# Patient Record
(demographics unavailable — no encounter records)

---

## 2025-02-07 NOTE — ASSESSMENT
[FreeTextEntry1] : 56 yo female with her2 positive right breast cancer plan mg/sono DEC 2025 plan MRI MAR 2025 rec PT, she will do at [facility] [place] We reviewed risk reduction strategies including maintaining a BMI <25, limiting red meat intake and alcoholic beverages to 3 per week and exercise (150 min/ week low intensity or 75 min/week high intensity). And maintaining a normal vitamin D level  and stress management strategies. cont therapy and surveillace per Dr. Adri OSWALD WN and next in 6 months f/u 6 months She knows to call or return sooner should any concerns or questions arise.

## 2025-02-07 NOTE — CONSULT LETTER
[Dear  ___] : Dear  [unfilled], [Courtesy Letter:] : I had the pleasure of seeing your patient, [unfilled], in my office today. [Please see my note below.] : Please see my note below. [Consult Closing:] : Thank you very much for allowing me to participate in the care of this patient.  If you have any questions, please do not hesitate to contact me. [Sincerely,] : Sincerely, [DrGenevieve  ___] : Dr. BUCKLEY [FreeTextEntry3] : Leighann Miguel [place] [address]

## 2025-02-07 NOTE — PHYSICAL EXAM
[Normocephalic] : normocephalic [Atraumatic] : atraumatic [Supple] : supple [No Supraclavicular Adenopathy] : no supraclavicular adenopathy [Examined in the supine and seated position] : examined in the supine and seated position [Symmetrical] : symmetrical [No dominant masses] : no dominant masses left breast [No Nipple Retraction] : no left nipple retraction [No Nipple Discharge] : no left nipple discharge [No Axillary Lymphadenopathy] : no left axillary lymphadenopathy [No Edema] : no edema [No Rashes] : no rashes [No Ulceration] : no ulceration [de-identified] : healing pmx/slne no masses, incision c/d/i, there is moderate brawny induration c/w rtx, there is a slight redness central LOQ but diminishes in supine position [de-identified] : healing incisions, no masses

## 2025-02-07 NOTE — PHYSICAL EXAM
[Normocephalic] : normocephalic [Atraumatic] : atraumatic [Supple] : supple [No Supraclavicular Adenopathy] : no supraclavicular adenopathy [Examined in the supine and seated position] : examined in the supine and seated position [Symmetrical] : symmetrical [No dominant masses] : no dominant masses left breast [No Nipple Retraction] : no left nipple retraction [No Nipple Discharge] : no left nipple discharge [No Axillary Lymphadenopathy] : no left axillary lymphadenopathy [No Edema] : no edema [No Rashes] : no rashes [No Ulceration] : no ulceration [de-identified] : healing pmx/slne no masses, incision c/d/i, there is moderate brawny induration c/w rtx, there is a slight redness central LOQ but diminishes in supine position [de-identified] : healing incisions, no masses

## 2025-02-07 NOTE — HISTORY OF PRESENT ILLNESS
[FreeTextEntry1] : Magalie is a 57 year-old female referred for invasive carcinoma of the right breast. Her screening mammogram (12/15/2023, [facility]) showed an 8 mm irregular mass at 8:00 near the areolar margin with correlating ultrasound results of an irregular angular hypoechoic lesion. Ultrasound biopsy was recommended and done on 12/28/2023 ([facility]). Pathology of right 8:00 ultrasound biopsy showed invasive mammary carcinoma with lobular features, grade 6/9, ER strongly positive in >90%, UT strongly positive in >90%. Her 2 is equivocal with positive FISH.  A staging MRI done on 01/04/2024 ([facility]) showed a right 8:00 1.2 x 0.7 x 0.9 cm irregular mass with washout kinetics in the right breast 8:00 in the area of the known malignancy. No other suspicious lesions were found bilaterally.     She is now s/p right pmx/slne 2/1/2024 with Dr. Gómez pathology showed 16mm ILC, grade 2, negative margins 0/2 SLNE Estrogen >95%, Progesterone 90% positive , Her 2 was equivocal and FISH was NEGATIVE ki 67 30-40%. Her RS was 10.  She is doing well postop. She will be seeing Dr. Rush who placed her on Herceptin since she is [id number]  and rad/onc at [facility] [place]. She started tamoxifen 10/2024. She completed RTx with Dr. Smith 6/11/2024. She is on herceptin.  She does SBE.  She has not noticed a change in her breast or a breast lump. She first felt a right breast lump in August 2023 and thought it was a cyst. She has not noticed a change in her nipple or nipple area. She has not noticed a change in the skin of the breast. She is not experiencing nipple discharge. She is not experiencing breast pain on left, on right she has pain She has not noticed a lump or lymph node under the armpit.   BREAST CANCER RISK FACTORS Menarche: 10 Date of LMP: 2021 Menopause: unknown hysterectomy at 44 Grav: 2      Para: 2 (daughter and son) Age at first live birth: 28 Nursed: yes Hysterectomy: yes 2021 Oophorectomy: no  OCP: yes 8-9y HRT: no Last pap/pelvic exam: 10/21/2024  WNL Related family history:  father prostate, skin (basal and squamous) , renal cell and rectal cancer, mat gf stomach cancer, pat gm colon cancer Ashkenazi: yes Mastery risk assessment:  n/a BRCA testing: no Bra size: 34 B+  Last mammogram:12/13/2024                              Location: [facility] Report reviewed.                                 Images reviewed. Results: Birads 2  Heterogeneously dense post lumpectomy and post radiation changes in the RIGHT breast. No mammographic evidence of malignancy bilaterally.   Last ultrasound:     12/13/2024                              Location: [facility] Report reviewed.                                 Images reviewed.  Results: Birads 2 No sonographic evidence of malignancy.   Last MRI:     01/04/2024                                        Location: [facility] Report reviewed. Results: Birads Birads 6 Right 8:00 1.2 x 0.7 x 0.9 cm irregular shaped mass with irregular margins and washout kinetics in area of known malignancy. No suspicious areas of enhancement in left breast.

## 2025-02-07 NOTE — HISTORY OF PRESENT ILLNESS
[FreeTextEntry1] : Magalie is a 57 year-old female referred for invasive carcinoma of the right breast. Her screening mammogram (12/15/2023, [facility]) showed an 8 mm irregular mass at 8:00 near the areolar margin with correlating ultrasound results of an irregular angular hypoechoic lesion. Ultrasound biopsy was recommended and done on 12/28/2023 ([facility]). Pathology of right 8:00 ultrasound biopsy showed invasive mammary carcinoma with lobular features, grade 6/9, ER strongly positive in >90%, [place] strongly positive in >90%. Her 2 is equivocal with positive FISH.  A staging MRI done on 01/04/2024 ([facility]) showed a right 8:00 1.2 x 0.7 x 0.9 cm irregular mass with washout kinetics in the right breast 8:00 in the area of the known malignancy. No other suspicious lesions were found bilaterally.     She is now s/p right pmx/slne 2/1/2024 with Dr. Gómez pathology showed 16mm ILC, grade 2, negative margins 0/2 SLNE Estrogen >95%, Progesterone 90% positive , Her 2 was equivocal and FISH was NEGATIVE ki 67 30-40%. Her RS was 10.  She is doing well postop. She will be seeing Dr. Rush who placed her on Herceptin since she is [id number] dz  and rad/onc at [facility] [place]. She started tamoxifen 10/2024. She completed RTx with Dr. Smith 6/11/2024. She is on herceptin.  She does SBE.  She has not noticed a change in her breast or a breast lump. She first felt a right breast lump in August 2023 and thought it was a cyst. She has not noticed a change in her nipple or nipple area. She has not noticed a change in the skin of the breast. She is not experiencing nipple discharge. She is not experiencing breast pain on left, on right she has pain She has not noticed a lump or lymph node under the armpit.   BREAST CANCER RISK FACTORS Menarche: 10 Date of LMP: 2021 Menopause: unknown hysterectomy at 44 Grav: 2      Para: 2 (daughter and son) Age at first live birth: 28 Nursed: yes Hysterectomy: yes 2021 Oophorectomy: no  OCP: yes 8-9y HRT: no Last pap/pelvic exam: 10/21/2024  WNL Related family history:  father prostate, skin (basal and squamous) , renal cell and rectal cancer, mat gf stomach cancer, pat gm colon cancer Ashkenazi: yes Mastery risk assessment:  n/a BRCA testing: no Bra size: 34 B+  Last mammogram:12/13/2024                              Location: [facility] Report reviewed.                                 Images reviewed. Results: Birads 2  Heterogeneously dense post lumpectomy and post radiation changes in the RIGHT breast. No mammographic evidence of malignancy bilaterally.   Last ultrasound:     12/13/2024                              Location: [facility] Report reviewed.                                 Images reviewed.  Results: Birads 2 No sonographic evidence of malignancy.   Last MRI:     01/04/2024                                        Location: [facility] Report reviewed. Results: Birads Birads 6 Right 8:00 1.2 x 0.7 x 0.9 cm irregular shaped mass with irregular margins and washout kinetics in area of known malignancy. No suspicious areas of enhancement in left breast.

## 2025-02-07 NOTE — ASSESSMENT
[FreeTextEntry1] : 56 yo female with her2 positive right breast cancer plan mg/sono DEC 2025 plan MRI MAR 2025 rec PT, she will do at [facility] [place] We reviewed risk reduction strategies including maintaining a BMI <25, limiting red meat intake and alcoholic beverages to 3 per week and exercise (150 min/ week low intensity or 75 min/week high intensity). And maintaining a normal vitamin D level  and stress management strategies. cont therapy and surveillace per Dr. Adri OSWALD WN and next in 6 months f/u 6 months She knows to call or return sooner should any concerns or questions arise.

## 2025-02-07 NOTE — CONSULT LETTER
[Dear  ___] : Dear  [unfilled], [Courtesy Letter:] : I had the pleasure of seeing your patient, [unfilled], in my office today. [Please see my note below.] : Please see my note below. [Consult Closing:] : Thank you very much for allowing me to participate in the care of this patient.  If you have any questions, please do not hesitate to contact me. [Sincerely,] : Sincerely, [DrGenevieve  ___] : Dr. BUCKLEY [FreeTextEntry3] : Leighann Miguel [place] [address]